# Patient Record
Sex: MALE | Race: WHITE | Employment: OTHER | URBAN - METROPOLITAN AREA
[De-identification: names, ages, dates, MRNs, and addresses within clinical notes are randomized per-mention and may not be internally consistent; named-entity substitution may affect disease eponyms.]

---

## 2019-08-15 ENCOUNTER — HOSPITAL ENCOUNTER (OUTPATIENT)
Dept: CT IMAGING | Age: 79
Discharge: HOME | End: 2019-08-15
Attending: ORTHOPAEDIC SURGERY
Payer: MEDICARE

## 2019-08-15 ENCOUNTER — HOSPITAL ENCOUNTER (OUTPATIENT)
Dept: GENERAL RADIOLOGY | Age: 79
Discharge: HOME | End: 2019-08-15
Attending: ORTHOPAEDIC SURGERY
Payer: MEDICARE

## 2019-08-15 DIAGNOSIS — M25.511 RIGHT SHOULDER PAIN: ICD-10-CM

## 2019-08-15 PROCEDURE — 74011636320 HC RX REV CODE- 636/320: Performed by: ORTHOPAEDIC SURGERY

## 2019-08-15 PROCEDURE — 77002 NEEDLE LOCALIZATION BY XRAY: CPT

## 2019-08-15 PROCEDURE — 73201 CT UPPER EXTREMITY W/DYE: CPT

## 2019-08-15 RX ORDER — SODIUM CHLORIDE 9 MG/ML
1-20 INJECTION INTRAMUSCULAR; INTRAVENOUS; SUBCUTANEOUS
Status: COMPLETED | OUTPATIENT
Start: 2019-08-15 | End: 2019-08-15

## 2019-08-15 RX ORDER — LIDOCAINE HYDROCHLORIDE 10 MG/ML
1-10 INJECTION, SOLUTION EPIDURAL; INFILTRATION; INTRACAUDAL; PERINEURAL
Status: COMPLETED | OUTPATIENT
Start: 2019-08-15 | End: 2019-08-15

## 2019-08-15 RX ADMIN — LIDOCAINE HYDROCHLORIDE 5 ML: 10 INJECTION, SOLUTION EPIDURAL; INFILTRATION; INTRACAUDAL; PERINEURAL at 11:36

## 2019-08-15 RX ADMIN — SODIUM CHLORIDE 16 ML: 9 INJECTION INTRAMUSCULAR; INTRAVENOUS; SUBCUTANEOUS at 11:36

## 2019-08-15 RX ADMIN — IOPAMIDOL 4 ML: 612 INJECTION, SOLUTION INTRAVENOUS at 11:36

## 2019-09-10 ENCOUNTER — HOSPITAL ENCOUNTER (OUTPATIENT)
Dept: PREADMISSION TESTING | Age: 79
Discharge: HOME | End: 2019-09-10
Payer: MEDICARE

## 2019-09-10 VITALS — WEIGHT: 196 LBS | HEIGHT: 60 IN | BODY MASS INDEX: 38.48 KG/M2

## 2019-09-10 LAB
ALBUMIN SERPL-MCNC: 4 G/DL (ref 3.4–5)
ALBUMIN/GLOB SERPL: 1.5 {RATIO} (ref 0.8–1.7)
ALP SERPL-CCNC: 72 U/L (ref 45–117)
ALT SERPL-CCNC: 27 U/L (ref 16–61)
ANION GAP SERPL CALC-SCNC: 3 MMOL/L (ref 3–18)
AST SERPL-CCNC: 17 U/L (ref 10–38)
BASOPHILS # BLD: 0.1 K/UL (ref 0–0.1)
BASOPHILS NFR BLD: 1 % (ref 0–2)
BILIRUB SERPL-MCNC: 1.1 MG/DL (ref 0.2–1)
BUN SERPL-MCNC: 22 MG/DL (ref 7–18)
BUN/CREAT SERPL: 13 (ref 12–20)
CALCIUM SERPL-MCNC: 9.1 MG/DL (ref 8.5–10.1)
CHLORIDE SERPL-SCNC: 108 MMOL/L (ref 100–111)
CO2 SERPL-SCNC: 30 MMOL/L (ref 21–32)
CREAT SERPL-MCNC: 1.63 MG/DL (ref 0.6–1.3)
DIFFERENTIAL METHOD BLD: ABNORMAL
EOSINOPHIL # BLD: 0.4 K/UL (ref 0–0.4)
EOSINOPHIL NFR BLD: 5 % (ref 0–5)
ERYTHROCYTE [DISTWIDTH] IN BLOOD BY AUTOMATED COUNT: 14.1 % (ref 11.6–14.5)
GLOBULIN SER CALC-MCNC: 2.6 G/DL (ref 2–4)
GLUCOSE SERPL-MCNC: 90 MG/DL (ref 74–99)
HCT VFR BLD AUTO: 41.8 % (ref 36–48)
HGB BLD-MCNC: 14 G/DL (ref 13–16)
LYMPHOCYTES # BLD: 1.6 K/UL (ref 0.9–3.6)
LYMPHOCYTES NFR BLD: 22 % (ref 21–52)
MCH RBC QN AUTO: 28.9 PG (ref 24–34)
MCHC RBC AUTO-ENTMCNC: 33.5 G/DL (ref 31–37)
MCV RBC AUTO: 86.2 FL (ref 74–97)
MONOCYTES # BLD: 0.8 K/UL (ref 0.05–1.2)
MONOCYTES NFR BLD: 12 % (ref 3–10)
NEUTS SEG # BLD: 4.3 K/UL (ref 1.8–8)
NEUTS SEG NFR BLD: 60 % (ref 40–73)
PLATELET # BLD AUTO: 206 K/UL (ref 135–420)
PMV BLD AUTO: 9.4 FL (ref 9.2–11.8)
POTASSIUM SERPL-SCNC: 4.6 MMOL/L (ref 3.5–5.5)
PROT SERPL-MCNC: 6.6 G/DL (ref 6.4–8.2)
RBC # BLD AUTO: 4.85 M/UL (ref 4.7–5.5)
SODIUM SERPL-SCNC: 141 MMOL/L (ref 136–145)
WBC # BLD AUTO: 7.1 K/UL (ref 4.6–13.2)

## 2019-09-10 PROCEDURE — 80053 COMPREHEN METABOLIC PANEL: CPT

## 2019-09-10 PROCEDURE — 85025 COMPLETE CBC W/AUTO DIFF WBC: CPT

## 2019-09-10 PROCEDURE — 36415 COLL VENOUS BLD VENIPUNCTURE: CPT

## 2019-09-10 RX ORDER — ASPIRIN 81 MG/1
81 TABLET ORAL DAILY
COMMUNITY

## 2019-09-10 RX ORDER — CEFAZOLIN SODIUM/WATER 2 G/20 ML
2 SYRINGE (ML) INTRAVENOUS ONCE
Status: CANCELLED | OUTPATIENT
Start: 2019-09-10 | End: 2019-09-10

## 2019-09-10 RX ORDER — TRAMADOL HYDROCHLORIDE 50 MG/1
50 TABLET ORAL AS NEEDED
COMMUNITY

## 2019-09-10 RX ORDER — LANSOPRAZOLE 30 MG/1
30 CAPSULE, DELAYED RELEASE ORAL
COMMUNITY

## 2019-09-10 RX ORDER — LOSARTAN POTASSIUM 25 MG/1
25 TABLET ORAL
COMMUNITY

## 2019-09-10 RX ORDER — SPIRONOLACTONE 25 MG/1
12.5 TABLET ORAL
COMMUNITY

## 2019-09-10 RX ORDER — ISOSORBIDE DINITRATE 30 MG/1
20 TABLET ORAL DAILY
COMMUNITY

## 2019-09-10 RX ORDER — DULOXETIN HYDROCHLORIDE 60 MG/1
60 CAPSULE, DELAYED RELEASE ORAL DAILY
COMMUNITY

## 2019-09-10 RX ORDER — CARVEDILOL 12.5 MG/1
12.5 TABLET ORAL 2 TIMES DAILY WITH MEALS
COMMUNITY

## 2019-09-10 RX ORDER — FUROSEMIDE 20 MG/1
10 TABLET ORAL
COMMUNITY

## 2019-09-10 RX ORDER — SODIUM CHLORIDE, SODIUM LACTATE, POTASSIUM CHLORIDE, CALCIUM CHLORIDE 600; 310; 30; 20 MG/100ML; MG/100ML; MG/100ML; MG/100ML
125 INJECTION, SOLUTION INTRAVENOUS CONTINUOUS
Status: CANCELLED | OUTPATIENT
Start: 2019-09-10

## 2019-09-10 NOTE — PERIOP NOTES
Patient does meet criteria for special pop. No hx of sleep apnea or previous screening. Denies hx of MH. PCP is aware of surgery. CHG skin care kit given and process reviewed. Not participating in any research study or clinical trials. Encouraged patient to check with surgeon about Anacin  and aspirin. States his doctor said he should not need to stop either. Patient has a [employer]tronic Defibrillator in right upper chest.; Info in media.

## 2019-09-11 NOTE — PERIOP NOTES
Local Medtronic Rep paged. Anish Samuels returned call and stated Okay to use magnet day of surgery.

## 2019-09-12 NOTE — PERIOP NOTES
Anesthesia reviewed chart, requested last Nephrology note, if not up to date on Nephrolgy follow up, patient will need Nephrology clearance. Office notified, left VM detailed message for Fredy at Dr Sarah Ramos office.

## 2019-09-12 NOTE — PERIOP NOTES
Fredy from Dr Ca Marshall office called back, she spoke with patient, patient does not see a Nephrologist. Fredy will contact PCP to get notes and information on patient Kidney Disease history.

## 2019-09-29 ENCOUNTER — ANESTHESIA EVENT (OUTPATIENT)
Dept: SURGERY | Age: 79
End: 2019-09-29
Payer: MEDICARE

## 2019-09-30 ENCOUNTER — HOSPITAL ENCOUNTER (OUTPATIENT)
Age: 79
Setting detail: OUTPATIENT SURGERY
Discharge: HOME | End: 2019-09-30
Attending: ORTHOPAEDIC SURGERY | Admitting: ORTHOPAEDIC SURGERY
Payer: MEDICARE

## 2019-09-30 ENCOUNTER — ANESTHESIA (OUTPATIENT)
Dept: SURGERY | Age: 79
End: 2019-09-30
Payer: MEDICARE

## 2019-09-30 VITALS
OXYGEN SATURATION: 95 % | WEIGHT: 190 LBS | HEIGHT: 60 IN | RESPIRATION RATE: 16 BRPM | BODY MASS INDEX: 37.3 KG/M2 | TEMPERATURE: 97.3 F | SYSTOLIC BLOOD PRESSURE: 157 MMHG | DIASTOLIC BLOOD PRESSURE: 89 MMHG | HEART RATE: 75 BPM

## 2019-09-30 DIAGNOSIS — M75.41 ROTATOR CUFF IMPINGEMENT SYNDROME OF RIGHT SHOULDER: Primary | ICD-10-CM

## 2019-09-30 PROCEDURE — 77030034478 HC TU IRR ARTHRO PT ARTH -B: Performed by: ORTHOPAEDIC SURGERY

## 2019-09-30 PROCEDURE — 77030002916 HC SUT ETHLN J&J -A: Performed by: ORTHOPAEDIC SURGERY

## 2019-09-30 PROCEDURE — 74011000250 HC RX REV CODE- 250

## 2019-09-30 PROCEDURE — 76942 ECHO GUIDE FOR BIOPSY: CPT | Performed by: ORTHOPAEDIC SURGERY

## 2019-09-30 PROCEDURE — 76060000035 HC ANESTHESIA 2 TO 2.5 HR: Performed by: ORTHOPAEDIC SURGERY

## 2019-09-30 PROCEDURE — 77030005326 HC CATH PAIN PMP/Q AVNM -C: Performed by: ORTHOPAEDIC SURGERY

## 2019-09-30 PROCEDURE — 74011250636 HC RX REV CODE- 250/636

## 2019-09-30 PROCEDURE — 77030020989 HC NDL NRV BLK ARRO -B: Performed by: ANESTHESIOLOGY

## 2019-09-30 PROCEDURE — 74011000250 HC RX REV CODE- 250: Performed by: ORTHOPAEDIC SURGERY

## 2019-09-30 PROCEDURE — C1713 ANCHOR/SCREW BN/BN,TIS/BN: HCPCS | Performed by: ORTHOPAEDIC SURGERY

## 2019-09-30 PROCEDURE — 77030004451 HC BUR SHV S&N -B: Performed by: ORTHOPAEDIC SURGERY

## 2019-09-30 PROCEDURE — 74011250636 HC RX REV CODE- 250/636: Performed by: ORTHOPAEDIC SURGERY

## 2019-09-30 PROCEDURE — 77030020782 HC GWN BAIR PAWS FLX 3M -B: Performed by: ORTHOPAEDIC SURGERY

## 2019-09-30 PROCEDURE — 77010033678 HC OXYGEN DAILY

## 2019-09-30 PROCEDURE — 64415 NJX AA&/STRD BRCH PLXS IMG: CPT | Performed by: ANESTHESIOLOGY

## 2019-09-30 PROCEDURE — 77030002919 HC SUT FBRTAPE ARTH -B: Performed by: ORTHOPAEDIC SURGERY

## 2019-09-30 PROCEDURE — 77030020268 HC MISC GENERAL SUPPLY: Performed by: ORTHOPAEDIC SURGERY

## 2019-09-30 PROCEDURE — 77030003598 HC NDL MULT/FIRE ARTH -C: Performed by: ORTHOPAEDIC SURGERY

## 2019-09-30 PROCEDURE — 77030012508 HC MSK AIRWY LMA AMBU -A: Performed by: NURSE ANESTHETIST, CERTIFIED REGISTERED

## 2019-09-30 PROCEDURE — 77030040361 HC SLV COMPR DVT MDII -B: Performed by: ORTHOPAEDIC SURGERY

## 2019-09-30 PROCEDURE — 77030018835 HC SOL IRR LR ICUM -A: Performed by: ORTHOPAEDIC SURGERY

## 2019-09-30 PROCEDURE — 76210000021 HC REC RM PH II 0.5 TO 1 HR: Performed by: ORTHOPAEDIC SURGERY

## 2019-09-30 PROCEDURE — 77030012711 HC WND ARTHRO ABLT S&N -D: Performed by: ORTHOPAEDIC SURGERY

## 2019-09-30 PROCEDURE — 76210000006 HC OR PH I REC 0.5 TO 1 HR: Performed by: ORTHOPAEDIC SURGERY

## 2019-09-30 PROCEDURE — 77030006884 HC BLD SHV INCIS S&N -B: Performed by: ORTHOPAEDIC SURGERY

## 2019-09-30 PROCEDURE — 76010000131 HC OR TIME 2 TO 2.5 HR: Performed by: ORTHOPAEDIC SURGERY

## 2019-09-30 PROCEDURE — 77030018673: Performed by: ORTHOPAEDIC SURGERY

## 2019-09-30 DEVICE — MULTIFIX S-ULTRA 5.5MM KNOTLESS ANCHOR
Type: IMPLANTABLE DEVICE | Site: SHOULDER | Status: FUNCTIONAL
Brand: MULTIFIX

## 2019-09-30 RX ORDER — SODIUM CHLORIDE, SODIUM LACTATE, POTASSIUM CHLORIDE, CALCIUM CHLORIDE 600; 310; 30; 20 MG/100ML; MG/100ML; MG/100ML; MG/100ML
125 INJECTION, SOLUTION INTRAVENOUS CONTINUOUS
Status: DISCONTINUED | OUTPATIENT
Start: 2019-09-30 | End: 2019-09-30 | Stop reason: HOSPADM

## 2019-09-30 RX ORDER — FENTANYL CITRATE 50 UG/ML
25 INJECTION, SOLUTION INTRAMUSCULAR; INTRAVENOUS AS NEEDED
Status: DISCONTINUED | OUTPATIENT
Start: 2019-09-30 | End: 2019-09-30 | Stop reason: HOSPADM

## 2019-09-30 RX ORDER — MIDAZOLAM HYDROCHLORIDE 1 MG/ML
INJECTION, SOLUTION INTRAMUSCULAR; INTRAVENOUS
Status: COMPLETED | OUTPATIENT
Start: 2019-09-30 | End: 2019-09-30

## 2019-09-30 RX ORDER — GLYCOPYRROLATE 0.2 MG/ML
INJECTION INTRAMUSCULAR; INTRAVENOUS AS NEEDED
Status: DISCONTINUED | OUTPATIENT
Start: 2019-09-30 | End: 2019-09-30 | Stop reason: HOSPADM

## 2019-09-30 RX ORDER — MEPERIDINE HYDROCHLORIDE 50 MG/1
100 TABLET ORAL
Qty: 50 TAB | Refills: 0 | Status: SHIPPED
Start: 2019-09-30 | End: 2019-10-07

## 2019-09-30 RX ORDER — ONDANSETRON 2 MG/ML
INJECTION INTRAMUSCULAR; INTRAVENOUS AS NEEDED
Status: DISCONTINUED | OUTPATIENT
Start: 2019-09-30 | End: 2019-09-30 | Stop reason: HOSPADM

## 2019-09-30 RX ORDER — FLUMAZENIL 0.1 MG/ML
0.2 INJECTION INTRAVENOUS
Status: DISCONTINUED | OUTPATIENT
Start: 2019-09-30 | End: 2019-09-30 | Stop reason: HOSPADM

## 2019-09-30 RX ORDER — HYDROMORPHONE HYDROCHLORIDE 1 MG/ML
0.5 INJECTION, SOLUTION INTRAMUSCULAR; INTRAVENOUS; SUBCUTANEOUS
Status: DISCONTINUED | OUTPATIENT
Start: 2019-09-30 | End: 2019-09-30 | Stop reason: HOSPADM

## 2019-09-30 RX ORDER — DEXAMETHASONE SODIUM PHOSPHATE 4 MG/ML
INJECTION, SOLUTION INTRA-ARTICULAR; INTRALESIONAL; INTRAMUSCULAR; INTRAVENOUS; SOFT TISSUE AS NEEDED
Status: DISCONTINUED | OUTPATIENT
Start: 2019-09-30 | End: 2019-09-30 | Stop reason: HOSPADM

## 2019-09-30 RX ORDER — ROPIVACAINE HYDROCHLORIDE 5 MG/ML
INJECTION, SOLUTION EPIDURAL; INFILTRATION; PERINEURAL
Status: COMPLETED | OUTPATIENT
Start: 2019-09-30 | End: 2019-09-30

## 2019-09-30 RX ORDER — DEXTROSE MONOHYDRATE 100 MG/ML
125-250 INJECTION, SOLUTION INTRAVENOUS AS NEEDED
Status: DISCONTINUED | OUTPATIENT
Start: 2019-09-30 | End: 2019-09-30 | Stop reason: HOSPADM

## 2019-09-30 RX ORDER — SODIUM CHLORIDE, SODIUM LACTATE, POTASSIUM CHLORIDE, CALCIUM CHLORIDE 600; 310; 30; 20 MG/100ML; MG/100ML; MG/100ML; MG/100ML
1000 INJECTION, SOLUTION INTRAVENOUS CONTINUOUS
Status: DISCONTINUED | OUTPATIENT
Start: 2019-09-30 | End: 2019-09-30 | Stop reason: HOSPADM

## 2019-09-30 RX ORDER — MIDAZOLAM HYDROCHLORIDE 1 MG/ML
INJECTION, SOLUTION INTRAMUSCULAR; INTRAVENOUS
Status: DISCONTINUED
Start: 2019-09-30 | End: 2019-09-30 | Stop reason: HOSPADM

## 2019-09-30 RX ORDER — BUPIVACAINE HYDROCHLORIDE AND EPINEPHRINE 2.5; 5 MG/ML; UG/ML
INJECTION, SOLUTION EPIDURAL; INFILTRATION; INTRACAUDAL; PERINEURAL AS NEEDED
Status: DISCONTINUED | OUTPATIENT
Start: 2019-09-30 | End: 2019-09-30 | Stop reason: HOSPADM

## 2019-09-30 RX ORDER — CEFAZOLIN SODIUM/WATER 2 G/20 ML
2 SYRINGE (ML) INTRAVENOUS ONCE
Status: COMPLETED | OUTPATIENT
Start: 2019-09-30 | End: 2019-09-30

## 2019-09-30 RX ORDER — SODIUM CHLORIDE 0.9 % (FLUSH) 0.9 %
5-40 SYRINGE (ML) INJECTION EVERY 8 HOURS
Status: DISCONTINUED | OUTPATIENT
Start: 2019-09-30 | End: 2019-09-30 | Stop reason: HOSPADM

## 2019-09-30 RX ORDER — MAGNESIUM SULFATE 100 %
4 CRYSTALS MISCELLANEOUS AS NEEDED
Status: DISCONTINUED | OUTPATIENT
Start: 2019-09-30 | End: 2019-09-30 | Stop reason: HOSPADM

## 2019-09-30 RX ORDER — LIDOCAINE HYDROCHLORIDE 20 MG/ML
INJECTION, SOLUTION EPIDURAL; INFILTRATION; INTRACAUDAL; PERINEURAL AS NEEDED
Status: DISCONTINUED | OUTPATIENT
Start: 2019-09-30 | End: 2019-09-30 | Stop reason: HOSPADM

## 2019-09-30 RX ORDER — NALOXONE HYDROCHLORIDE 0.4 MG/ML
0.1 INJECTION, SOLUTION INTRAMUSCULAR; INTRAVENOUS; SUBCUTANEOUS AS NEEDED
Status: DISCONTINUED | OUTPATIENT
Start: 2019-09-30 | End: 2019-09-30 | Stop reason: HOSPADM

## 2019-09-30 RX ORDER — SODIUM CHLORIDE 0.9 % (FLUSH) 0.9 %
5-40 SYRINGE (ML) INJECTION AS NEEDED
Status: DISCONTINUED | OUTPATIENT
Start: 2019-09-30 | End: 2019-09-30 | Stop reason: HOSPADM

## 2019-09-30 RX ORDER — PROPOFOL 10 MG/ML
INJECTION, EMULSION INTRAVENOUS AS NEEDED
Status: DISCONTINUED | OUTPATIENT
Start: 2019-09-30 | End: 2019-09-30 | Stop reason: HOSPADM

## 2019-09-30 RX ADMIN — DEXAMETHASONE SODIUM PHOSPHATE 4 MG: 4 INJECTION, SOLUTION INTRA-ARTICULAR; INTRALESIONAL; INTRAMUSCULAR; INTRAVENOUS; SOFT TISSUE at 11:24

## 2019-09-30 RX ADMIN — MIDAZOLAM HYDROCHLORIDE 2 MG: 1 INJECTION, SOLUTION INTRAMUSCULAR; INTRAVENOUS at 10:40

## 2019-09-30 RX ADMIN — Medication 2 G: at 11:21

## 2019-09-30 RX ADMIN — PROPOFOL 50 MG: 10 INJECTION, EMULSION INTRAVENOUS at 11:18

## 2019-09-30 RX ADMIN — ROPIVACAINE HYDROCHLORIDE 20 ML: 5 INJECTION, SOLUTION EPIDURAL; INFILTRATION; PERINEURAL at 10:40

## 2019-09-30 RX ADMIN — GLYCOPYRROLATE 0.2 MG: 0.2 INJECTION INTRAMUSCULAR; INTRAVENOUS at 12:56

## 2019-09-30 RX ADMIN — SODIUM CHLORIDE, SODIUM LACTATE, POTASSIUM CHLORIDE, AND CALCIUM CHLORIDE: 600; 310; 30; 20 INJECTION, SOLUTION INTRAVENOUS at 11:20

## 2019-09-30 RX ADMIN — GLYCOPYRROLATE 0.1 MG: 0.2 INJECTION INTRAMUSCULAR; INTRAVENOUS at 11:40

## 2019-09-30 RX ADMIN — ONDANSETRON 4 MG: 2 INJECTION INTRAMUSCULAR; INTRAVENOUS at 11:24

## 2019-09-30 RX ADMIN — SODIUM CHLORIDE, SODIUM LACTATE, POTASSIUM CHLORIDE, AND CALCIUM CHLORIDE 125 ML/HR: 600; 310; 30; 20 INJECTION, SOLUTION INTRAVENOUS at 09:10

## 2019-09-30 RX ADMIN — PROPOFOL 100 MG: 10 INJECTION, EMULSION INTRAVENOUS at 11:15

## 2019-09-30 RX ADMIN — LIDOCAINE HYDROCHLORIDE 100 MG: 20 INJECTION, SOLUTION EPIDURAL; INFILTRATION; INTRACAUDAL; PERINEURAL at 11:15

## 2019-09-30 NOTE — H&P
Patient Name:   Cecilia Rice  Account #:  [de-identified]  YOB: 1940    Chief Complaint:  Right shoulder pain. History of Chief Complaint: This is a 29-year-old male who complains of problems with his right shoulder. He has had some pain in the shoulder for many years, but he was functioning well until he did heavy lifting about six weeks ago and developed pain in the shoulder. He saw Dr. Nitin Bird, who had him get a CT-arthrogram of his right shoulder. This study showed tearing of the infraspinatus and supraspinatus with some mild retraction, but no significant atrophy. The long head of the biceps appears to have been previously ruptured and he had severe degenerative changes at the  [facility] joint and mild glenohumeral DJD.     Past Medical/Surgical History:    Disease/Disorder Date Side Surgery Date Side Comment   Arthritis         Fibromyalgia         Hypertension         Kidney disease         Kidney stroke 2011        MI - Myocardial infarction 2017 [employer] 05/15/2019 -X 3   Nerve disorder            Cardiac stent implant 2017 [employer] 05/15/2019 -X 3      Carpal tunnel release 1997 right       Kidney artery stent implant 2015        Surgery, cervical spine 11/29/2006  [facility] 05/14/2019 - C3-T1      Surgery, cervical spine 1998        Surgery, lumbar spine 1981       Allergies:    Ingredient Reaction Medication Name Comment   ERYTHROMYCIN BASE      MELOXICAM GI Problems Mobic    DULOXETINE HCL  Cymbalta    LANSOPRAZOLE  Prevacid    CLOPIDOGREL BISULFATE Itching Plavix    STATINS-HMG-COA REDUCTASE INHIBITORS Leg cramp; Bone pain         Current Medications:    Medication Directions   Anacin    carvedilol 12.5 mg tablet    Cymbalta 60 mg capsule,delayed release    furosemide 20 mg tablet    GLUCOSAMINE/CHONDR CONCEPCION A SOD    isosorbide mononitrate ER 30 mg tablet,extended release 24 hr    losartan 25 mg tablet    spironolactone 25 mg tablet    Vitamin D3      Social History:    SMOKING  Status Tobacco Type Units Per Day Yrs Used   Former smoker Cigarette     ALCOHOL  There is no history of alcohol use. Family History:    Disease Detail Family Member Age Cause of Death Comments   Family history of Diabetes mellitus   N    Family history of Hypertension   N    Family history of Arthritis   N    Myocardial infarction Father  N    Emphysema Father  N    Renal disease Father  N      Review of Systems:    Pertinent negatives include fever, muscle weakness and swelling of feet. Vitals:  Date BP Pulse Temp (F) Resp. (per min.) Height (Total in.) Weight (lbs.) BMI   08/28/2019     67.00 185.00 28.97   08/08/2019     67.00  28.97   06/06/2019     67.00  28.97   05/16/2019     67.00  28.97   12/08/2016     66.00  29.86   02/18/2016 126/71 70   66.00  29.86   08/20/2015     66.00  36.13       Physical Examination:  General:  Patient in no acute distress. Vital Signs: See database for vital signs. HEENT: Normal.  Neck: Supple. Chest: Clear. Heart: Regular sinus rhythm. Abdomen: Soft, nontender, no adenopathy. Neurologic: Normal exam.  Extremities:       Exam of the right shoulder shows weakness in abduction, although he has reasonably good strength in external rotation. He had strongly positive impingement signs. Neurovascular exam is normal.    Radiograph Examination:  AP, scapular, and axillary views of the right shoulder were obtained and interpreted in the office today and reveal slight irregularity of the acromion but good preservation of the glenohumeral space and no sign of fracture. Impression:  Right shoulder acute rotator cuff tear with impingement, degenerative joint disease of the acromioclavicular joint and previous tear of the biceps. Plan:  He will be scheduled for a right shoulder arthroscopic decompression, resection of distal clavicle and rotator cuff repair. He understands the risks and benefits of the procedure, and he is ready to proceed. He was given a sling.   Postop, he will get Demerol.

## 2019-09-30 NOTE — PERIOP NOTES
MARY ANNE Cr RN notified to make Dr Ulisses Dinh aware that patient had aspirin last on 9/26/19. Notified Autumn DOUGLAS at [address] [place] [address]  that patient has a [employer] defibrillator.

## 2019-09-30 NOTE — DISCHARGE INSTRUCTIONS
DISCHARGE SUMMARY from Nurse    PATIENT INSTRUCTIONS:    After general anesthesia or intravenous sedation, for 24 hours or while taking prescription Narcotics:  · Limit your activities  · Do not drive and operate hazardous machinery  · Do not make important personal or business decisions  · Do  not drink alcoholic beverages  · If you have not urinated within 8 hours after discharge, please contact your surgeon on call. Report the following to your surgeon:  · Excessive pain, swelling, redness or odor of or around the surgical area  · Temperature over 100.5  · Nausea and vomiting lasting longer than 4 hours or if unable to take medications  · Any signs of decreased circulation or nerve impairment to extremity: change in color, persistent  numbness, tingling, coldness or increase pain  · Any questions    What to do at Home:  [address] DR Last Mejia  RETURN TO OFFICE IN 1 WEEK CALL FOR APPT    If you experience any of the following symptoms heavy bleeding, fevers, severe pain, circulation changes, please follow up with dr Jazmin Patel    *  Please give a list of your current medications to your Primary Care Provider. *  Please update this list whenever your medications are discontinued, doses are      changed, or new medications (including over-the-counter products) are added. *  Please carry medication information at all times in case of emergency situations. These are general instructions for a healthy lifestyle:    No smoking/ No tobacco products/ Avoid exposure to second hand smoke  Surgeon General's Warning:  Quitting smoking now greatly reduces serious risk to your health.     Obesity, smoking, and sedentary lifestyle greatly increases your risk for illness    A healthy diet, regular physical exercise & weight monitoring are important for maintaining a healthy lifestyle    You may be retaining fluid if you have a history of heart failure or if you experience any of the following symptoms:  Weight gain of 3 pounds or more overnight or 5 pounds in a week, increased swelling in our hands or feet or shortness of breath while lying flat in bed. Please call your doctor as soon as you notice any of these symptoms; do not wait until your next office visit. The discharge information has been reviewed with the patient and caregiver. The patient and caregiver verbalized understanding. Discharge medications reviewed with the patient and caregiver and appropriate educational materials and side effects teaching were provided.   ___________________________________________________________________________________________________________________________________    Patient armband removed and shredded

## 2019-09-30 NOTE — ANESTHESIA POSTPROCEDURE EVALUATION
Procedure(s):  RIGHT SHOULDER ARTHROSCOPIC DECOMPRESSION, RESECTION DISTAL CLAVICLE, MANIPULATION UNDER ANESTHESIA, ROTATOR CUFF REPAIR GEN WITH SCALENE BLOCK PRN, \"SPEC POP\". general, regional    Anesthesia Post Evaluation        Comments: Post-Anesthesia Evaluation and Assessment    Cardiovascular Function/Vital Signs  BP (!) 154/94   Pulse 76   Temp 36.2 °C (97.2 °F)   Resp 18   Ht 5' 0.25\" (1.53 m)   Wt 86.2 kg (190 lb)   SpO2 96%   BMI 36.80 kg/m²     Patient is status post Procedure(s):  RIGHT SHOULDER ARTHROSCOPIC DECOMPRESSION, RESECTION DISTAL CLAVICLE, MANIPULATION UNDER ANESTHESIA, ROTATOR CUFF REPAIR GEN WITH SCALENE BLOCK PRN, \"SPEC POP\". Nausea/Vomiting: Controlled. Postoperative hydration reviewed and adequate. Pain:  Pain Scale 1: Visual (09/30/19 1403)  Pain Intensity 1: 0 (09/30/19 1403)   Managed. Neurological Status:   Neuro (WDL): Exceptions to WDL (09/30/19 1332)   At baseline. Mental Status and Level of Consciousness: Arousable. Pulmonary Status:   O2 Device: Room air (09/30/19 1423)   Adequate oxygenation and airway patent. Complications related to anesthesia: None    Post-anesthesia assessment completed. No concerns. Patient has met all discharge requirements. Signed By: Mark Acuña MD    September 30, 2019                   Vitals Value Taken Time   /94 9/30/2019  2:20 PM   Temp 36.2 °C (97.2 °F) 9/30/2019  1:32 PM   Pulse 74 9/30/2019  2:25 PM   Resp 15 9/30/2019  2:25 PM   SpO2 96 % 9/30/2019  2:25 PM   Vitals shown include unvalidated device data.

## 2019-09-30 NOTE — PERIOP NOTES
Reviewed PTA medication list with patient/caregiver and patient/caregiver denies any additional medications. Patient admits to having a responsible adult care for them for at least 24 hours after surgery.     Dual skin assessment completed by Kory May RN and Stephanie Dang RN.

## 2019-09-30 NOTE — ANESTHESIA PROCEDURE NOTES
Peripheral Block    Start time: 9/30/2019 10:40 AM  End time: 9/30/2019 10:44 AM  Performed by: Tanja Valentin MD  Authorized by: Tanja Valentin MD       Pre-procedure: Indications: at surgeon's request and procedure for pain    Preanesthetic Checklist: patient identified, risks and benefits discussed, site marked, timeout performed, anesthesia consent given and patient being monitored    Timeout Time: 10:40          Block Type:   Block Type:   Interscalene  Laterality:  Right  Monitoring:  Standard ASA monitoring, continuous pulse ox, frequent vital sign checks, heart rate, responsive to questions and oxygen  Injection Technique:  Single shot  Procedures: ultrasound guided and nerve stimulator    Patient Position: seated  Prep: chlorhexidine    Location:  Interscalene  Needle Type:  Stimuplex  Needle Gauge:  22 G  Needle Localization:  Anatomical landmarks and ultrasound guidance    Assessment:  Number of attempts:  1  Injection Assessment:  Incremental injection every 5 mL, local visualized surrounding nerve on ultrasound, negative aspiration for CSF, negative aspiration for blood, no paresthesia, no intravascular symptoms and ultrasound image on chart  Patient tolerance:  Patient tolerated the procedure well with no immediate complications

## 2019-09-30 NOTE — PERIOP NOTES
TRANSFER - OUT REPORT:    Verbal report given to Augusta Foley RN (name) on Creig Barthel  being transferred to phase 2(unit) for routine post - op       Report consisted of patients Situation, Background, Assessment and   Recommendations(SBAR). Information from the following report(s) SBAR, Intake/Output and MAR was reviewed with the receiving nurse. Lines:   Peripheral IV 09/30/19 Left;Posterior Hand (Active)   Site Assessment Clean, dry, & intact 9/30/2019  2:07 PM   Phlebitis Assessment 0 9/30/2019  2:07 PM   Infiltration Assessment 0 9/30/2019  2:07 PM   Dressing Status Clean, dry, & intact 9/30/2019  2:07 PM   Dressing Type Transparent;Tape 9/30/2019  2:07 PM   Hub Color/Line Status Pink; Infusing 9/30/2019  2:07 PM   Alcohol Cap Used No 9/30/2019  9:09 AM       Subcutaneous Infusion Line 09/30/19 Right Shoulder (Active)   Site Assessment Clean, dry, & intact 9/30/2019  2:07 PM   Dressing Status Clean, dry, & intact 9/30/2019  2:07 PM   Dressing Type Tape 9/30/2019  2:07 PM   Hub Color/Line Status Infusing 9/30/2019  2:07 PM        Opportunity for questions and clarification was provided.       Patient transported with:   [employer]

## 2019-09-30 NOTE — ANESTHESIA PREPROCEDURE EVALUATION
Relevant Problems   No relevant active problems       Anesthetic History     PONV          Review of Systems / Medical History  Patient summary reviewed, nursing notes reviewed and pertinent labs reviewed    Pulmonary  Within defined limits                 Neuro/Psych   Within defined limits           Cardiovascular    Hypertension: well controlled        Dysrhythmias   Pacemaker and CAD    Exercise tolerance: >4 METS  Comments: Patient said he is not on plavix. Just aspirin   GI/Hepatic/Renal     GERD: well controlled    Renal disease: CRI    Pertinent negatives: No liver disease   Endo/Other        Arthritis  Pertinent negatives: No diabetes, hypothyroidism and hyperthyroidism   Other Findings   Comments: Previous neck surgery PCF           Physical Exam    Airway  Mallampati: II  TM Distance: < 4 cm  Neck ROM: decreased range of motion   Mouth opening: Normal     Cardiovascular    Rhythm: regular  Rate: normal         Dental  No notable dental hx       Pulmonary  Breath sounds clear to auscultation               Abdominal  GI exam deferred       Other Findings            Anesthetic Plan    ASA: 3  Anesthesia type: general and regional - interscalene block      Post-op pain plan if not by surgeon: peripheral nerve block single    Induction: Intravenous  Anesthetic plan and risks discussed with: Patient      Risk of a block include nerve injury, bleeding, infection, and failure as the most common ones although they rare.   Single shot

## 2019-09-30 NOTE — OP NOTES
PREOPERATIVE DIAGNOSES:    1. Rotator cuff tear, right shoulder  2. Impingement. 3. Degenerative arthritis of the acromioclavicular joint. POSTOPERATIVE DIAGNOSES:    1. Rotator cuff tear, right shoulder  2. Impingement. 3. Degenerative arthritis of the acromioclavicular joint. 4. Frozen shoulder    PROCEDURES PERFORMED:    1. Arthroscopic decompression. 2. Resection distal clavicle. 3. Rotator cuff repair, right shoulder. 4. Manipulation under anesthesia    SURGEON:  Bishop Figueroa. Yenny Burch MD    ANESTHESIOLOGIST:  Anesthesiologist: Terrell Morgan MD  CRNA: Ester Caldwell CRNA    ASSISTANTS: Circ-1: Kristyn Patel RN  Circ-Relief: Rolando Rivera; [facility]  Scr Tech-1: [employer]  Surg Asst-1: Tanner CRENSHAW    ANESTHESIA:   General anesthesia after scalene block. COMPLICATIONS:  None. BLOOD LOSS:  Minimal.      SPECIMENS: None    DESCRIPTION OF PROCEDURE:  This 78 y.o.  male, with a history of persistent pain in the right shoulder, unresponsive to conservative care. The patient had a MRI showing the above noted findings and was then scheduled for surgery. PROCEDURE:  The patient was taken to the operating room and given general anesthesia after a scalene block. When it was adequate, the right shoulder was examined and showed limited motion with no gross instability. The shoulder was gently manipulated into full forward flexion and rotatation with the audible and palpable release of adhesions. Repeat exam showed no instability. .  The patient was placed in a left lateral decubitus position. The right shoulder was placed in traction, prepped and draped in a normal manner and injected with 30 mL of 1% Marcaine with Epinephrine. Anterior and posterior stab wounds were made, and a standard arthroscopic examination was performed. This revealed a massive tear  of the supraspinatus and the undersurface of the tear was debrided until all of the nonviable tissue was removed. The biceps and the superior labrum were previously torn, and the articular surfaces of the joint appeared normal.  The instruments were then redirected in the subacromial space. A lateral portal was established and a limited bursectomy was carried out. The rotator cuff tear was confirmed, and the cuff was mobilized and could be brought back down into acceptable position. The soft tissue was removed from the anterior acromion and distal clavicle, including the coracoacromial ligament. There was a sharp hooked appearance on the anterior acromion, and severe arthritic changes of the [facility] joint with complete loss of joint cartilage, and large inferior osteophytes. A high-speed bur was used to perform an acromioplasty. The same level of resection was carried across the distal clavicle. The arthroscope was switched to the lateral portal to assure that adequate bone removal had been achieved, and the result was a flat acromion. The [facility] joint was approached directly through the anterior portal.  The most medial few millimeters of the acromion and the distal centimeter of the clavicle were removed arthroscopically. Bleeding points were treated with the electrocautery wand for hemostasis. The original footprint was cleaned of soft tissue and a high-speed bur was used to create a bleeding surface. A double row Ultra-Tape technique was used for the repair. Two Multifix S 5.5mm PEEK anchors were placed along the medial border of the footprint. Each was loaded with a #2 Ultrabraid and a Ultra-Tape. The sutures were passed through the cuff away from the edge. The FiberTapes were crisscrossed and secured to the bone beyond the footprint laterally using another pair of Multifix S 5.5mm PEEK anchors. The Ultrabraid sutures were then tied to one another at the base to complete the repair. The instruments were removed. The pain pump catheter was left in the subacromial space.   The wounds were closed using 3-0 nylon suture. A sterile compressive dressing was applied, along with a sling. The patient left the operating room in satisfactory condition.

## 2019-09-30 NOTE — INTERVAL H&P NOTE
H&P Update: 
Polly Lopez was seen and examined. History and physical has been reviewed. The patient has been examined. There have been no significant clinical changes since the completion of the originally dated History and Physical. 
Patient identified by surgeon; surgical site was confirmed by patient and surgeon.

## 2020-06-17 NOTE — PROGRESS NOTES
.  PT aware of need to hold anticoagulants per protocol. Will stop asa per MD order  PT aware of potential for  need for  at discharge. PT aware of arrival time pre procedure. Will arrive at 1000 for 1100 procedure. Pt states no questions at this time. PT screened for symptoms of COVID-19:  fever, cough, and shortness of breath. Reports no known exposure and no symptoms of covid 19. Patient states he cannot wear a mask due to breathing problems.

## 2020-06-23 ENCOUNTER — HOSPITAL ENCOUNTER (OUTPATIENT)
Dept: GENERAL RADIOLOGY | Age: 80
Discharge: HOME | End: 2020-06-23
Attending: ORTHOPAEDIC SURGERY | Admitting: ORTHOPAEDIC SURGERY
Payer: MEDICARE

## 2020-06-23 ENCOUNTER — APPOINTMENT (OUTPATIENT)
Dept: CT IMAGING | Age: 80
End: 2020-06-23
Attending: ORTHOPAEDIC SURGERY
Payer: MEDICARE

## 2020-06-23 VITALS
DIASTOLIC BLOOD PRESSURE: 78 MMHG | TEMPERATURE: 97.8 F | BODY MASS INDEX: 30.29 KG/M2 | WEIGHT: 193 LBS | RESPIRATION RATE: 16 BRPM | HEIGHT: 67 IN | HEART RATE: 64 BPM | SYSTOLIC BLOOD PRESSURE: 175 MMHG | OXYGEN SATURATION: 97 %

## 2020-06-23 DIAGNOSIS — M54.50 LOW BACK PAIN: ICD-10-CM

## 2020-06-23 PROCEDURE — 72132 CT LUMBAR SPINE W/DYE: CPT

## 2020-06-23 PROCEDURE — 62304 MYELOGRAPHY LUMBAR INJECTION: CPT

## 2020-06-23 PROCEDURE — 74011636320 HC RX REV CODE- 636/320: Performed by: ORTHOPAEDIC SURGERY

## 2020-06-23 RX ORDER — ACETAMINOPHEN 325 MG/1
650 TABLET ORAL
Status: DISCONTINUED | OUTPATIENT
Start: 2020-06-23 | End: 2020-06-23 | Stop reason: HOSPADM

## 2020-06-23 RX ORDER — HYDROCODONE BITARTRATE AND ACETAMINOPHEN 5; 325 MG/1; MG/1
1-2 TABLET ORAL
Status: DISCONTINUED | OUTPATIENT
Start: 2020-06-23 | End: 2020-06-23 | Stop reason: HOSPADM

## 2020-06-23 RX ORDER — LIDOCAINE HYDROCHLORIDE 10 MG/ML
1-10 INJECTION, SOLUTION EPIDURAL; INFILTRATION; INTRACAUDAL; PERINEURAL
Status: COMPLETED | OUTPATIENT
Start: 2020-06-23 | End: 2020-06-23

## 2020-06-23 RX ADMIN — IOPAMIDOL 15 ML: 408 INJECTION, SOLUTION INTRATHECAL at 12:04

## 2020-06-23 RX ADMIN — LIDOCAINE HYDROCHLORIDE 5 ML: 10 INJECTION, SOLUTION EPIDURAL; INFILTRATION; INTRACAUDAL; PERINEURAL at 12:04

## 2020-06-23 NOTE — DISCHARGE INSTRUCTIONS
Patient Education     DISCHARGE SUMMARY from Nurse    PATIENT INSTRUCTIONS:    After general anesthesia or intravenous sedation, for 24 hours or while taking prescription Narcotics:  · Limit your activities  · Do not drive and operate hazardous machinery  · Do not make important personal or business decisions  · Do  not drink alcoholic beverages  · If you have not urinated within 8 hours after discharge, please contact your surgeon on call. Report the following to your surgeon:  · Excessive pain, swelling, redness or odor of or around the surgical area  · Temperature over 100.5  · Nausea and vomiting lasting longer than 4 hours or if unable to take medications  · Any signs of decreased circulation or nerve impairment to extremity: change in color, persistent  numbness, tingling, coldness or increase pain  · Any questions    What to do at Home:  Recommended activity:     If you experience any of the following symptoms   , please follow up with MD.    *  Please give a list of your current medications to your Primary Care Provider. *  Please update this list whenever your medications are discontinued, doses are      changed, or new medications (including over-the-counter products) are added. *  Please carry medication information at all times in case of emergency situations. These are general instructions for a healthy lifestyle:    No smoking/ No tobacco products/ Avoid exposure to second hand smoke  Surgeon General's Warning:  Quitting smoking now greatly reduces serious risk to your health.     Obesity, smoking, and sedentary lifestyle greatly increases your risk for illness    A healthy diet, regular physical exercise & weight monitoring are important for maintaining a healthy lifestyle    You may be retaining fluid if you have a history of heart failure or if you experience any of the following symptoms:  Weight gain of 3 pounds or more overnight or 5 pounds in a week, increased swelling in our hands or feet or shortness of breath while lying flat in bed. Please call your doctor as soon as you notice any of these symptoms; do not wait until your next office visit. The discharge information has been reviewed with the patient and caregiver. The patient and caregiver verbalized understanding. Discharge medications reviewed with the patient and caregiver and appropriate educational materials and side effects teaching were provided. ___________________________________________________________________________________________________________________________________     Myelogram: About This Test  What is it? A myelogram uses X-rays and a special dye to make pictures of bones and nerves of the spine (spinal canal). The spinal canal holds the spinal cord, the spinal nerve roots, and the fluid-filled space between the bones in your spine. Why is this test done? A myelogram is done to check for:  · The cause of arm or leg numbness, weakness, or pain. · Narrowing of the spinal canal (spinal stenosis). · A tumor or infection causing problems with the spinal cord or nerve roots. · A spinal disc that has ruptured (herniated disc). · Inflammation of the membrane that covers the brain and spinal cord. · Problems with the blood vessels to the spine. This test may help find the cause of pain that can't be found by other tests, such as an MRI or a CT scan. How do you prepare for the test?  Your doctor will tell you if you need to change how much you eat and drink before the myelogram. You may be asked to increase the amount of water you drink before the test. Follow the instructions your doctor gives you about eating and drinking. If you take aspirin or some other blood thinner, ask your doctor if you should stop taking it before your test. Make sure that you understand exactly what your doctor wants you to do. These medicines increase the risk of bleeding. Be sure you have someone to take you home.  Anesthesia and pain medicine will make it unsafe for you to drive or get home on your own. How is the test done? The dye is put into your spinal canal with a thin needle. This is called a lumbar puncture. The dye moves through the space so the nerve roots and spinal cord can be seen more clearly. After the dye is put in, you will lie still while the X-ray pictures are taken. How does it feel? You will feel a quick sting from a small needle that has medicine to numb the skin on your back. You will also feel some pressure as the long, thin spinal needle is put into your spinal canal. You may feel a quick, sharp pain down your buttock or leg when the needle is moved in your spine. You may find it hard to lie on your stomach or side during this test.  The dye may make you feel warm and flushed and have a metallic taste in your mouth. Some people feel sick to their stomach or have a headache. Tell your doctor how you are feeling. How long does the test take? · A myelogram usually takes 30 minutes to 1 hour. What happens after the test?  · You will probably be able to go home 30 minutes to 2 hours after the test.  · You may need to lie in bed with your head raised for 4 to 24 hours after the test. To prevent seizures, which are a rare side effect, do not bend over or lie down with your head lower than your body. Keeping your head higher than your body after a myelogram also may help prevent or reduce other side effects, such as headache, nausea, or vomiting. · Avoid strenuous activity, such as running or heavy lifting, for at least 1 day after your myelogram.  · Drink plenty of water after the myelogram. Your doctor will give you instructions on taking your regular medicines. When should you call for help? [id number] anytime you think you may need emergency care. For example, call if:  · You have a seizure.   Call your doctor now or seek immediate medical care if:  · You have any increase in pain, weakness, or numbness in your legs.  · You have a severe headache or stiff neck, or your eyes become very sensitive to light. · You have a headache that lasts longer than 24 hours. · You have problems urinating or having a bowel movement. · You have a fever. Follow-up care is a key part of your treatment and safety. Be sure to make and go to all appointments, and call your doctor if you are having problems. It's also a good idea to keep a list of the medicines you take. Ask your doctor when you can expect to have your test results. Where can you learn more? Go to http://syl-neisha.info/  Enter J721 in the search box to learn more about \"Myelogram: About This Test.\"  Current as of: December 9, 2019               Content Version: 12.5  © 1682-5331 [employer]. Care instructions adapted under license by [employer] (which disclaims liability or warranty for this information). If you have questions about a medical condition or this instruction, always ask your healthcare professional. [place] [address] any warranty or liability for your use of this information.   Patient armband removed and shredded

## 2020-06-23 NOTE — PROCEDURES
Interventional Radiology Brief Procedure Note    Performed By: Danie Morgan PA-C    Attending Radiologist: Anna Boo MD    Pre-operative Diagnosis:  Low back pain    Post-operative Diagnosis: Same as pre-op diagnosis    Procedure(s) Performed:  Lumbar Myelogram    Anesthesia:  Local Anesthesia    Findings:  The patient's low back was prepped in the usual sterile manner. 1% Lidocaine was used for local anesthesia. A 22 G spinal needle was advanced into the spinal subarachnoid space via a sublaminar oblique approach at L3. There was immediate return of clear CSF. 15 cc of Isovue 200M was instilled into dural sac. Images and subsequent CT scan to follow. Complications: None immediate    Estimated Blood Loss:  Minimal    Tubes and Drains: None    Specimens: None    Condition: Good    Disposition:  Observation x 4 hours, then D/C home.     Danie Morgan PA-C  [facility]  Vascular & Interventional Radiology  6/23/2020

## 2020-06-23 NOTE — PROGRESS NOTES
Back from procedure. Band-aid intact to low back. Denies pain. Diet given. 1330 Assisted to bathroom, back to bed. 1530 Discharge instructions reviewed,  1545 Discharged home in stable condition via w/c in care of daughter. Denies pain. Band-Aid intact to low back .

## (undated) DEVICE — Device

## (undated) DEVICE — 5.5 MM STONECUTTER STRAIGHT BURRS,                                    POWER/EP-1, OLIVE, 8000 MAXIMUM RPM,                                    PACKAGED 6 PER BOX, STERILE

## (undated) DEVICE — TOWEL,OR,DSP,ST,BLUE,STD,4/PK,20PK/CS: Brand: MEDLINE

## (undated) DEVICE — 4-PORT MANIFOLD: Brand: NEPTUNE 2

## (undated) DEVICE — TUBE IRRIG L8IN LNG PT W/ CONN FOR PMP SYS REDEUCE

## (undated) DEVICE — KIT INFUS PMP 270ML 2M/HR SOAK CATH L2.5IN N NARC ON-Q

## (undated) DEVICE — 4.5 MM INCISOR PLUS STRAIGHT                                    BLADES, POWER/EP-1, VIOLET, PACKAGED                                    6 PER BOX, STERILE

## (undated) DEVICE — GARMENT,MEDLINE,DVT,INT,CALF,MED, GEN2: Brand: MEDLINE

## (undated) DEVICE — SUPER TURBOVAC 90 INTEGRATED CABLE WAND ICW: Brand: COBLATION

## (undated) DEVICE — PREP SKN PREVAIL 40ML APPL --

## (undated) DEVICE — SUTURE FIBERLINK SZ 2-0 L26IN NONABSORBABLE BLU CLS LOOP AR7235

## (undated) DEVICE — PACK PROCEDURE SURG ORTH SHLDR CUST

## (undated) DEVICE — CANISTER SUCTION HI FLO 30000CC FLUID MGMT SYS TRUCLEAR DISP

## (undated) DEVICE — SUT ETHLN 3-0 18IN PS2 BLK --

## (undated) DEVICE — STRAP,POSITIONING,KNEE/BODY,FOAM,4X60": Brand: MEDLINE

## (undated) DEVICE — NEEDLE SUT PASS FOR ROT CUF LABRAL REP MULTFI SCORPION

## (undated) DEVICE — ULTRATAPE 2MM BLUE 38 PKG OF 6

## (undated) DEVICE — SHOULDER SUSPENSION KIT 6 PER BOX

## (undated) DEVICE — SOLUTION IRRIG 3000ML LAC R FLX CONT

## (undated) DEVICE — 3M™ STERI-DRAPE™ INCISE DRAPE 1050 (60CM X 45CM): Brand: STERI-DRAPE™